# Patient Record
Sex: FEMALE | Race: WHITE | NOT HISPANIC OR LATINO | URBAN - METROPOLITAN AREA
[De-identification: names, ages, dates, MRNs, and addresses within clinical notes are randomized per-mention and may not be internally consistent; named-entity substitution may affect disease eponyms.]

---

## 2022-01-16 ENCOUNTER — EMERGENCY (EMERGENCY)
Facility: HOSPITAL | Age: 32
LOS: 0 days | Discharge: HOME | End: 2022-01-16
Attending: EMERGENCY MEDICINE | Admitting: EMERGENCY MEDICINE
Payer: SELF-PAY

## 2022-01-16 VITALS
HEART RATE: 100 BPM | DIASTOLIC BLOOD PRESSURE: 74 MMHG | OXYGEN SATURATION: 99 % | WEIGHT: 156.09 LBS | RESPIRATION RATE: 18 BRPM | SYSTOLIC BLOOD PRESSURE: 127 MMHG | TEMPERATURE: 98 F

## 2022-01-16 DIAGNOSIS — Z95.0 PRESENCE OF CARDIAC PACEMAKER: ICD-10-CM

## 2022-01-16 DIAGNOSIS — R21 RASH AND OTHER NONSPECIFIC SKIN ERUPTION: ICD-10-CM

## 2022-01-16 DIAGNOSIS — T78.40XA ALLERGY, UNSPECIFIED, INITIAL ENCOUNTER: ICD-10-CM

## 2022-01-16 DIAGNOSIS — L50.9 URTICARIA, UNSPECIFIED: ICD-10-CM
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
## 2022-01-16 DIAGNOSIS — L29.9 PRURITUS, UNSPECIFIED: ICD-10-CM
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
## 2022-01-16 DIAGNOSIS — Z95.0 PRESENCE OF CARDIAC PACEMAKER: Chronic | ICD-10-CM

## 2022-01-16 PROCEDURE — 93010 ELECTROCARDIOGRAM REPORT: CPT

## 2022-01-16 PROCEDURE — 99284 EMERGENCY DEPT VISIT MOD MDM: CPT

## 2022-01-16 RX ORDER — DIPHENHYDRAMINE HCL 50 MG
50 CAPSULE ORAL ONCE
Refills: 0 | Status: COMPLETED | OUTPATIENT
Start: 2022-01-16 | End: 2022-01-16

## 2022-01-16 RX ORDER — FAMOTIDINE 10 MG/ML
20 INJECTION INTRAVENOUS ONCE
Refills: 0 | Status: COMPLETED | OUTPATIENT
Start: 2022-01-16 | End: 2022-01-16

## 2022-01-16 RX ADMIN — Medication 50 MILLIGRAM(S): at 14:11

## 2022-01-16 RX ADMIN — FAMOTIDINE 20 MILLIGRAM(S): 10 INJECTION INTRAVENOUS at 14:09

## 2022-01-16 RX ADMIN — Medication 60 MILLIGRAM(S): at 14:09

## 2022-01-16 NOTE — ED PROVIDER NOTE - ATTENDING CONTRIBUTION TO CARE
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 On review of systems, reports mild increased cough from baseline cough. Denies chest pain or shortness of breath though states since this started she also feels a slight limitation to a full breath. Denies all new exposures, recent antibiotics, OCP use, leg swelling, abdominal pain, vaginal discharge, urinary symptoms, possibility of retained tampon, recent dental procedure, and all other symptoms. On exam, afebrile, hemodynamically stable, saturating well, NAD, well appearing, sitting comfortably in bed, no WOB, speaking full sentences, head NCAT, EOMI grossly, anicteric, MMM, no JVD, RRR, nml S1/S2, no m/r/g, lungs CTAB, no w/r/r, abd soft, NT, ND, nml BS, no rebound or guarding, AAO, CN's 3-12 grossly intact, BRADLEY spontaneously, no leg cyanosis or edema, skin warm, well perfused, noted appearance of hives to back as well as mild to face. No e/o oropharyngeal or systemic, anaphylactic effect. Character low suspicion for PE and no evidence of DVT. No fever or systemic symptoms. Appearance consistent with that of allergic hives. Given Pepcid, Benadryl, and prednisone with improvement. Patient is well appearing, NAD, afebrile, hemodynamically stable. Any available tests and studies were discussed with patient and friend. Discharged with instructions in further symptomatic care, return precautions including worsening SOB/CP, and need for allergy f/u.

## 2022-01-16 NOTE — ED PROVIDER NOTE - CARE PROVIDER_API CALL
Lizzeth Henson)  Allergy and Immunology; Internal Medicine  [address] [place] [address]  Phone: [phone]  Fax: [phone]  Follow Up Time:

## 2022-01-16 NOTE — ED PROVIDER NOTE - OBJECTIVE STATEMENT
31y F pmh pacemaker for bradycardia presents for eval of rash. Pt has mildly pruritic rash to trunk and arms since this morning, no aggravating or relieving factors.

## 2022-01-16 NOTE — ED ADULT NURSE NOTE - CHIEF COMPLAINT QUOTE
Pt developed a rash on the upper body since last night. Pt states she has burning feeling in the chest. Pt has pacemaker.

## 2022-01-16 NOTE — ED PROVIDER NOTE - PATIENT PORTAL LINK FT
You can access the FollowMyHealth Patient Portal offered by [facility] by registering at the following website: [email or web address]. By joining [facility]’s FollowMyHealth portal, you will also be able to view your health information using other applications (apps) compatible with our system.

## 2022-01-16 NOTE — ED PROVIDER NOTE - NSFOLLOWUPINSTRUCTIONS_ED_ALL_ED_FT
Follow up with PMD and Allergist in 1-2 days.    Allergic Reaction    An allergic reaction is an abnormal reaction to a substance (allergen) by the body's defense system. Common allergens include medicines, food, insect bites or stings, and blood products. The body releases certain proteins into the blood that can cause a variety of symptoms such as an itchy rash, wheezing, swelling of the face/lips/tongue/throat, abdominal pain, nausea or vomiting. An allergic reaction is usually treated with medication. If your health care provider prescribed you an epinephrine injection device, make sure to keep it with you at all times.    SEEK IMMEDIATE MEDICAL CARE IF YOU HAVE THE FOLLOWING SYMPTOMS: allergic reaction severe enough that required you to use epinephrine, tightness in your chest, swelling around your lips/tongue/throat, abdominal pain, vomiting or diarrhea, or lightheadedness/dizziness. These symptoms may represent a serious problem that is an emergency. Do not wait to see if the symptoms will go away. Use your auto-injector pen or anaphylaxis kit as you have been instructed, and get medical help right away. Call your local emergency services (911 in the [place] Do not drive yourself to the hospital.

## 2022-01-16 NOTE — ED PROVIDER NOTE - NS ED ROS FT
Constitutional: (-) fever  Eyes/ENT: (-) blurry vision, (-) epistaxis  Cardiovascular: (-) chest pain, (-) syncope  Respiratory: (-) cough, (-) shortness of breath  Gastrointestinal: (-) vomiting, (-) diarrhea  : (-) dysuria, (-) hematuria  Musculoskeletal: (-) neck pain, (-) back pain, (-) joint pain  Integumentary: (+) rash, (-) edema  Neurological: (-) headache, (-) altered mental status  Allergic/Immunologic: (+) pruritus

## 2022-01-16 NOTE — ED ADULT TRIAGE NOTE - CHIEF COMPLAINT QUOTE
Pt developed a rash on the upper body since last night. Pt states she has burning in the chest. Pt has pacemaker. Pt developed a rash on the upper body since last night. Pt states she has burning feeling in the chest. Pt has pacemaker.

## 2022-01-16 NOTE — ED PROVIDER NOTE - PHYSICAL EXAMINATION
CONST: Well appearing in NAD  EYES: Sclera and conjunctiva clear.   ENT: No nasal discharge. Oropharynx normal appearing, no erythema or exudates. No abscess or swelling. Uvula midline.   NECK: Non-tender, no meningeal signs. normal ROM. supple   CARD: S1 S2; No jvd  RESP: Equal BS B/L, No wheezes, rhonchi or rales. No distress  GI: Soft, non-tender, non-distended. normal BS  MS: Normal ROM in all extremities. pulses 2 +. no calf tenderness or swelling  SKIN: Maculopapular rash to trunk and arms  NEURO: A&Ox3, No focal deficits. Strength 5/5 with no sensory deficits.

## 2024-11-06 ENCOUNTER — EMERGENCY (EMERGENCY)
Facility: HOSPITAL | Age: 34
LOS: 0 days | Discharge: HOME | End: 2024-11-06
Attending: EMERGENCY MEDICINE
Payer: SELF-PAY

## 2024-11-06 VITALS
HEIGHT: 61 IN | DIASTOLIC BLOOD PRESSURE: 82 MMHG | WEIGHT: 149.91 LBS | RESPIRATION RATE: 18 BRPM | HEART RATE: 84 BPM | SYSTOLIC BLOOD PRESSURE: 116 MMHG | TEMPERATURE: 98 F | OXYGEN SATURATION: 99 %

## 2024-11-06 DIAGNOSIS — O23.41 UNSPECIFIED INFECTION OF URINARY TRACT IN PREGNANCY, FIRST TRIMESTER: ICD-10-CM

## 2024-11-06 DIAGNOSIS — O99.891 OTHER SPECIFIED DISEASES AND CONDITIONS COMPLICATING PREGNANCY: ICD-10-CM

## 2024-11-06 DIAGNOSIS — O99.011 ANEMIA COMPLICATING PREGNANCY, FIRST TRIMESTER: ICD-10-CM

## 2024-11-06 DIAGNOSIS — Z3A.13 13 WEEKS GESTATION OF PREGNANCY: ICD-10-CM

## 2024-11-06 DIAGNOSIS — Z95.0 PRESENCE OF CARDIAC PACEMAKER: ICD-10-CM

## 2024-11-06 DIAGNOSIS — R07.9 CHEST PAIN, UNSPECIFIED: ICD-10-CM

## 2024-11-06 DIAGNOSIS — Z95.0 PRESENCE OF CARDIAC PACEMAKER: Chronic | ICD-10-CM

## 2024-11-06 LAB
ALBUMIN SERPL ELPH-MCNC: 3.9 G/DL — SIGNIFICANT CHANGE UP (ref 3.5–5.2)
ALP SERPL-CCNC: 46 U/L — SIGNIFICANT CHANGE UP (ref 30–115)
ALT FLD-CCNC: 12 U/L — SIGNIFICANT CHANGE UP (ref 0–41)
ANION GAP SERPL CALC-SCNC: 9 MMOL/L — SIGNIFICANT CHANGE UP (ref 7–14)
APPEARANCE UR: ABNORMAL
APTT BLD: 32.7 SEC — SIGNIFICANT CHANGE UP (ref 27–39.2)
AST SERPL-CCNC: 16 U/L — SIGNIFICANT CHANGE UP (ref 0–41)
BACTERIA # UR AUTO: ABNORMAL /HPF
BASOPHILS # BLD AUTO: 0.03 K/UL — SIGNIFICANT CHANGE UP (ref 0–0.2)
BASOPHILS NFR BLD AUTO: 0.3 % — SIGNIFICANT CHANGE UP (ref 0–1)
BILIRUB SERPL-MCNC: 0.4 MG/DL — SIGNIFICANT CHANGE UP (ref 0.2–1.2)
BILIRUB UR-MCNC: NEGATIVE — SIGNIFICANT CHANGE UP
BLD GP AB SCN SERPL QL: SIGNIFICANT CHANGE UP
BUN SERPL-MCNC: 9 MG/DL — LOW (ref 10–20)
CALCIUM SERPL-MCNC: 9.2 MG/DL — SIGNIFICANT CHANGE UP (ref 8.4–10.5)
CAST: 0 /LPF — SIGNIFICANT CHANGE UP (ref 0–4)
CHLORIDE SERPL-SCNC: 103 MMOL/L — SIGNIFICANT CHANGE UP (ref 98–110)
CO2 SERPL-SCNC: 23 MMOL/L — SIGNIFICANT CHANGE UP (ref 17–32)
COLOR SPEC: YELLOW — SIGNIFICANT CHANGE UP
CREAT SERPL-MCNC: 0.5 MG/DL — LOW (ref 0.7–1.5)
D DIMER BLD IA.RAPID-MCNC: 268 NG/ML DDU — HIGH
DIFF PNL FLD: NEGATIVE — SIGNIFICANT CHANGE UP
EGFR: 125 ML/MIN/1.73M2 — SIGNIFICANT CHANGE UP
EOSINOPHIL # BLD AUTO: 0.08 K/UL — SIGNIFICANT CHANGE UP (ref 0–0.7)
EOSINOPHIL NFR BLD AUTO: 0.8 % — SIGNIFICANT CHANGE UP (ref 0–8)
GLUCOSE SERPL-MCNC: 92 MG/DL — SIGNIFICANT CHANGE UP (ref 70–99)
GLUCOSE UR QL: NEGATIVE MG/DL — SIGNIFICANT CHANGE UP
HCG SERPL-ACNC: HIGH MIU/ML
HCT VFR BLD CALC: 33.1 % — LOW (ref 37–47)
HGB BLD-MCNC: 11.7 G/DL — LOW (ref 12–16)
IMM GRANULOCYTES NFR BLD AUTO: 0.4 % — HIGH (ref 0.1–0.3)
INR BLD: 1.01 RATIO — SIGNIFICANT CHANGE UP (ref 0.65–1.3)
KETONES UR-MCNC: NEGATIVE MG/DL — SIGNIFICANT CHANGE UP
LEUKOCYTE ESTERASE UR-ACNC: ABNORMAL
LYMPHOCYTES # BLD AUTO: 1.83 K/UL — SIGNIFICANT CHANGE UP (ref 1.2–3.4)
LYMPHOCYTES # BLD AUTO: 17.6 % — LOW (ref 20.5–51.1)
MAGNESIUM SERPL-MCNC: 1.8 MG/DL — SIGNIFICANT CHANGE UP (ref 1.8–2.4)
MCHC RBC-ENTMCNC: 33 PG — HIGH (ref 27–31)
MCHC RBC-ENTMCNC: 35.3 G/DL — SIGNIFICANT CHANGE UP (ref 32–37)
MCV RBC AUTO: 93.2 FL — SIGNIFICANT CHANGE UP (ref 81–99)
MONOCYTES # BLD AUTO: 0.65 K/UL — HIGH (ref 0.1–0.6)
MONOCYTES NFR BLD AUTO: 6.3 % — SIGNIFICANT CHANGE UP (ref 1.7–9.3)
NEUTROPHILS # BLD AUTO: 7.74 K/UL — HIGH (ref 1.4–6.5)
NEUTROPHILS NFR BLD AUTO: 74.6 % — SIGNIFICANT CHANGE UP (ref 42.2–75.2)
NITRITE UR-MCNC: NEGATIVE — SIGNIFICANT CHANGE UP
NRBC # BLD: 0 /100 WBCS — SIGNIFICANT CHANGE UP (ref 0–0)
PH UR: 6 — SIGNIFICANT CHANGE UP (ref 5–8)
PLATELET # BLD AUTO: 240 K/UL — SIGNIFICANT CHANGE UP (ref 130–400)
PMV BLD: 9.5 FL — SIGNIFICANT CHANGE UP (ref 7.4–10.4)
POTASSIUM SERPL-MCNC: 4.1 MMOL/L — SIGNIFICANT CHANGE UP (ref 3.5–5)
POTASSIUM SERPL-SCNC: 4.1 MMOL/L — SIGNIFICANT CHANGE UP (ref 3.5–5)
PROT SERPL-MCNC: 6.3 G/DL — SIGNIFICANT CHANGE UP (ref 6–8)
PROT UR-MCNC: SIGNIFICANT CHANGE UP MG/DL
PROTHROM AB SERPL-ACNC: 11.9 SEC — SIGNIFICANT CHANGE UP (ref 9.95–12.87)
RBC # BLD: 3.55 M/UL — LOW (ref 4.2–5.4)
RBC # FLD: 11.8 % — SIGNIFICANT CHANGE UP (ref 11.5–14.5)
RBC CASTS # UR COMP ASSIST: 0 /HPF — SIGNIFICANT CHANGE UP (ref 0–4)
SODIUM SERPL-SCNC: 135 MMOL/L — SIGNIFICANT CHANGE UP (ref 135–146)
SP GR SPEC: 1.03 — SIGNIFICANT CHANGE UP (ref 1–1.03)
SQUAMOUS # UR AUTO: 4 /HPF — SIGNIFICANT CHANGE UP (ref 0–5)
TROPONIN T, HIGH SENSITIVITY RESULT: <6 NG/L — SIGNIFICANT CHANGE UP (ref 6–13)
UROBILINOGEN FLD QL: 1 MG/DL — SIGNIFICANT CHANGE UP (ref 0.2–1)
WBC # BLD: 10.37 K/UL — SIGNIFICANT CHANGE UP (ref 4.8–10.8)
WBC # FLD AUTO: 10.37 K/UL — SIGNIFICANT CHANGE UP (ref 4.8–10.8)
WBC UR QL: 4 /HPF — SIGNIFICANT CHANGE UP (ref 0–5)

## 2024-11-06 PROCEDURE — 71045 X-RAY EXAM CHEST 1 VIEW: CPT

## 2024-11-06 PROCEDURE — 83735 ASSAY OF MAGNESIUM: CPT

## 2024-11-06 PROCEDURE — 85730 THROMBOPLASTIN TIME PARTIAL: CPT

## 2024-11-06 PROCEDURE — 93010 ELECTROCARDIOGRAM REPORT: CPT

## 2024-11-06 PROCEDURE — 86900 BLOOD TYPING SEROLOGIC ABO: CPT

## 2024-11-06 PROCEDURE — 76815 OB US LIMITED FETUS(S): CPT | Mod: 26

## 2024-11-06 PROCEDURE — 86901 BLOOD TYPING SEROLOGIC RH(D): CPT

## 2024-11-06 PROCEDURE — 85610 PROTHROMBIN TIME: CPT

## 2024-11-06 PROCEDURE — 84484 ASSAY OF TROPONIN QUANT: CPT

## 2024-11-06 PROCEDURE — 71045 X-RAY EXAM CHEST 1 VIEW: CPT | Mod: 26

## 2024-11-06 PROCEDURE — 81001 URINALYSIS AUTO W/SCOPE: CPT

## 2024-11-06 PROCEDURE — 93005 ELECTROCARDIOGRAM TRACING: CPT

## 2024-11-06 PROCEDURE — 80053 COMPREHEN METABOLIC PANEL: CPT

## 2024-11-06 PROCEDURE — 87086 URINE CULTURE/COLONY COUNT: CPT

## 2024-11-06 PROCEDURE — 86850 RBC ANTIBODY SCREEN: CPT

## 2024-11-06 PROCEDURE — 36415 COLL VENOUS BLD VENIPUNCTURE: CPT

## 2024-11-06 PROCEDURE — 93970 EXTREMITY STUDY: CPT

## 2024-11-06 PROCEDURE — 93970 EXTREMITY STUDY: CPT | Mod: 26

## 2024-11-06 PROCEDURE — 99285 EMERGENCY DEPT VISIT HI MDM: CPT | Mod: 25

## 2024-11-06 PROCEDURE — 99285 EMERGENCY DEPT VISIT HI MDM: CPT

## 2024-11-06 PROCEDURE — 36000 PLACE NEEDLE IN VEIN: CPT

## 2024-11-06 PROCEDURE — 76815 OB US LIMITED FETUS(S): CPT

## 2024-11-06 PROCEDURE — 84702 CHORIONIC GONADOTROPIN TEST: CPT

## 2024-11-06 PROCEDURE — 85025 COMPLETE CBC W/AUTO DIFF WBC: CPT

## 2024-11-06 PROCEDURE — 85379 FIBRIN DEGRADATION QUANT: CPT

## 2024-11-06 RX ORDER — CEPHALEXIN 125 MG/5ML
1 SUSPENSION, RECONSTITUTED, ORAL (ML) ORAL
Qty: 21 | Refills: 0
Start: 2024-11-06 | End: 2024-11-12

## 2024-11-06 NOTE — ED PROVIDER NOTE - CARE PROVIDER_API CALL
none
JOEL PICKETT  [address] [facility],  [address]  Phone: [phone]  Fax: [phone]  Follow Up Time: 1-3 Days

## 2024-11-06 NOTE — ED PROVIDER NOTE - ATTENDING APP SHARED VISIT CONTRIBUTION OF CARE
35 y/o female with h/o bradycardia s/p PPM at age 11, recent + home pregnancy test, in ER with c/o CP.  Pt states she has h/o irreg periods, LMP was 8/2024, she just took a home preg test which was +.  Earlier today, she had an episode of CP.  states she had sharp L sided CP, non-radiating, lasted ~ 20 minutes and then resolved.  no SOB, but states it hurt when she took a deep breath.  no n/v, no diaphoresis. no palpitations, no dizziness, no syncope/near syncope.   no LE pain/swelling. has been feeling fatigued.  no abd/pelvic pain. no VB or abnormal vaginal discharge. no dysuria, hematuria, mild urinary frequency for the past 2 months.  no HA.  symptoms lasted for 20 minutes and then resolved, no return of symptoms.  Pt follows yearly with card in [place].  last echo was ~ 1 yr ago.  PPM was changed 3 yrs ago.    PE - nad, nc/at, eomi, perrl, op - clear, mmm, neck supple, cta b/l, no w/r/r, rrr, abd- soft, nt/nd, nabs, from x 4, no LE swelling/tenderness, A&O x 3, cn 2-12 intact, no focal motor/sensory deficits.   -check labs, cardiac w/u, pelvic sono

## 2024-11-06 NOTE — ED ADULT NURSE NOTE - BREATH SOUNDS, MLM
DM Type 2 (Diabetes Mellitus, Type 2) (ICD9 250.00)    HTN (Hypertension) (ICD9 401.9)    Hyperlipidemia    Osteoarthritis of Knee (ICD9 715.96)  b/l knees  Uterine Cancer (ICD9 179)  treated with hysterectomy and radiation therapy in 2008
Clear

## 2024-11-06 NOTE — ED PROVIDER NOTE - OBJECTIVE STATEMENT
34-year-old female with past medical history of pacemaker for bradycardia presented for evaluation of an episode of left-sided chest pain rating to left arm that started around 11 AM this morning.  States that it radiates to her back and lasted about 20 minutes.  Patient also notes that she is pregnant and has not had a workup yet.  No abdominal pain, vaginal bleeding, vaginal discharge, or urinary symptoms.  No leg swelling or calf pain.

## 2024-11-06 NOTE — ED ADULT TRIAGE NOTE - CHIEF COMPLAINT QUOTE
L arm pain & chest pain radiating from L shoulder pain since 11am. Pt + pregnancy test a couple of days ago. Unsure when LMP was.

## 2024-11-06 NOTE — ED PROVIDER NOTE - CONSIDERATION OF ADMISSION OBSERVATION
Consideration of Admission/Observation admission/obs considered, but pt's symptoms fully resolved, low suspicion for significant pathology, and ER w/u unremarkable.  pt comfortable going home with card/obgyn outpt f/u and aware of return precautions.

## 2024-11-06 NOTE — ED PROVIDER NOTE - NSFOLLOWUPINSTRUCTIONS_ED_ALL_ED_FT
**follow up with your OBGYN within 1-3 days and follow up with your cardiologist**    Chest Pain    Chest pain can be caused by many different conditions which may or may not be dangerous. Causes include heartburn, lung infections, heart attack, blood clot in lungs, skin infections, strain or damage to muscle, cartilage, or bones, etc. In addition to a history and physical examination, an electrocardiogram (ECG) or other lab tests may have been performed to determine the cause of your chest pain. Follow up with your primary care provider or with a cardiologist as instructed.     SEEK IMMEDIATE MEDICAL CARE IF YOU HAVE ANY OF THE FOLLOWING SYMPTOMS: worsening chest pain, coughing up blood, unexplained back/neck/jaw pain, severe abdominal pain, dizziness or lightheadedness, fainting, shortness of breath, sweaty or clammy skin, vomiting, or racing heart beat. These symptoms may represent a serious problem that is an emergency. Do not wait to see if the symptoms will go away. Get medical help right away. Call 911 and do not drive yourself to the hospital.    Urinary Tract Infection    A urinary tract infection (UTI) is an infection of any part of the urinary tract, which includes the kidneys, ureters, bladder, and urethra. Risk factors include ignoring your need to urinate, wiping back to front if female, being an uncircumcised male, and having diabetes or a weak immune system. Symptoms include frequent urination, pain or burning with urination, foul smelling urine, cloudy urine, pain in the lower abdomen, blood in the urine, and fever. If you were prescribed an antibiotic medicine, take it as told by your health care provider. Do not stop taking the antibiotic even if you start to feel better.    SEEK IMMEDIATE MEDICAL CARE IF YOU HAVE ANY OF THE FOLLOWING SYMPTOMS: severe back or abdominal pain, fever, inability to keep fluids or medicine down, dizziness/lightheadedness, or a change in mental status.

## 2024-11-06 NOTE — ED PROVIDER NOTE - PATIENT PORTAL LINK FT
You can access the FollowMyHealth Patient Portal offered by [facility] by registering at the following website: [email or web address]. By joining [facility]’s FollowMyHealth portal, you will also be able to view your health information using other applications (apps) compatible with our system.

## 2024-11-06 NOTE — ED ADULT NURSE NOTE - SUICIDE SCREENING QUESTION 3
No
[FreeTextEntry1] : 27 year old P3 F presenting for repeat sonogram to ensure completion of ETOP. On ultrasound today, the endometrial lining is thickened with highly vascularized decidual tissue, suggestive of AV malformation. Low suspicion for placental polyps in this case.\par -uterus is normal size, cervix is long and closed\par -no pain or tenderness on exam\par -will continue to follow serial beta hCG\par -f/u CBC drawn today as well\par -f/u in 2 weeks for repeat beta hCG and repeat GYN ultrasound to monitor findings\par -advised patient to return sooner for heavy bleeding or any other complaints\par \par =============================================\par I, Celia Kilpatrick, acted solely as a scribe for Dr. Chente Rocha on Jan 18 2022  7:20PM. All medical entries made by the Scribe were at my, Dr. Chente Rocha's, direction and personally dictated by me on Jan 18 2022  7:20PM . I have reviewed the chart and agree that the record accurately reflects my personal performance of the history, physical exam, assessment, and plan. I have also personally directed, reviewed, and agreed with the chart.\par =============================================

## 2024-11-06 NOTE — ED PROVIDER NOTE - NSFOLLOWUPCLINICS_GEN_ALL_ED_FT
[facility]  OB/GYN  [address] [place] [address]  Phone: [phone]  Fax:   Follow Up Time: 1-3 Days

## 2024-11-06 NOTE — ED PROVIDER NOTE - PROGRESS NOTE DETAILS
Using YEARS criteria PE can be excluded. Labs reviewed: CBC with mild anemia (Hgb 11.7), CMP unremarkable.  HST <6, D-dimer: 268.  hCG 28,050.  UA: Trace leuks, few bacteria.  Pelvic sono: + IUP corresponding to gestational age of 13 weeks 4 days, + FH@159.  Prelim LE duplex negative for DVT.  CXR negative.  EKG: Paced rhythm@79.  Pt's symptoms lasted 20 minutes and then resolved.  low suspicion for ACS and HST neg. low suspicion for PE and YEARS criteria negative (d-dimer < 500 and LE duplex neg).  PM interrogated in ER, which showed no recent events.  Pt remains aymptomatic in ER, did not have any palpitations/dizziness/syncope/near syncope.  Spoke with on call doctor covering for pt's cardiologist (pt follows with Dr. Velasco in [place]), states symptoms unlikley to be related to her PPM, he will pass a message to her cardiologist and pt can f/u as outpt.   All above d/w pt and pt given copy of all available results.  UA with trace leuks and few bacteria.  as she is pregnant will give rx for keflex and pt will follow-up results of ucx  pt will f/u with her cardiologist tomorrow and to also f/u [facility] obgyn.  pt comfortable going home, aware she should return to ER if CP recurs, for any SOB, LE pain/swelling, palpitations, dizziness, fever, abdominal/pelvic pain, vaginal bleeding, or any other new/concerning symptom.  Patient understands and agrees with plan.

## 2024-11-08 LAB
CULTURE RESULTS: NO GROWTH — SIGNIFICANT CHANGE UP
SPECIMEN SOURCE: SIGNIFICANT CHANGE UP

## 2025-01-02 NOTE — HISTORY OF PRESENT ILLNESS
[FreeTextEntry1] : [facility] Progress Note  Date of Service:  25  Patient is a 33yo , @22w3d, LARRY 25 by LMP c/w first trimester sono, presenting for consultation for pacemaker. Currently managed by Dr. Castillo.  Ms. Martinez is considering termination of pregnancy.  There was cervical dilation with prolapsing membranes into vagina on OB ultrasound today.  Patient denies contractions, leakage of fluid or bleeding per vagina.   Physical Exam:  Deferre  OBUS:  A/P: 33yo , @22w3d, LARRY 25, with congenital heart block with pacemaker, and painless cervical dilation who is considering termination of pregnancy.  Patient to go to labor and delivery for further evaluation.  I spoke with Dr. Haile of Family Planning service and with the OB resident service at [facility].  Further management per the OB team.  If patient desires to continue pregnancy, then recommend admission to [facility] as high risk for PTL and PPROM with early delivery at cusp of viability.   2.  Congenital Heart Block with Pacemaker - Follows with Cardiologist: Dr. Ragsdale in [place], [place] - Next appointment 1/10  Ronnie aPz MD Maternal Fetal Medicine  Total time 50 minutes excluding teaching and separately reported services.

## 2025-01-05 NOTE — HISTORY OF PRESENT ILLNESS
[FreeTextEntry1] : 25 [facility] Att'g and PGY3 initial office Consult Note:    Ms Martinez is referred by Dr Castillo and returns after recent hosp for VB and adv cx dilation, d/c'd AMA on . [id number], @22w1d (larry 25 by LMP c/w [facility] CRL), initially presented to [facility] for consultation for pacemaker.  Ms. Martinez first established care with [facility] at 20w.   At her 21w4d OB visit she reported an episode of heavy post-coital vag bleeding, BRB, soaking bedsheets, 48h prior (0200,).  SSE showed membranes passed ext os, and US showed 2.5cm cervical dilation. She had already been evaluated at [facility] and left there AMA.  She has not had any further bleeding episodes.  She was told  at [facility] that she may have a shortened cervix and that there may be amniontic membrane passing into the cervix.   Admission to [facility] - for painless cervical dilation. Pt had TVUS performed by sonographer. On ultrasound cervix external os appeared 1-2cm dilated. Speculum performed, cervix visually 2cm dilated with membranes at the level of the external os. She received latency antibiotics. Patient AMA'd from hospital on .  Today she has no c/o.  She denies LAP/pressure, no cramping, no LOF, VB.  She perceives FM.  States she left the hospital last night mainly due to roommate with dementia interrupting her sleep and the fact that her partner could not stay overnight for support. She denies contractions, leakage of fluid, vaginal bleeding. Endorses good fetal movement.   PMHx:  Congenital heart block with pacemaker since 12yo (see below).                All: denies Meds: denies  OB Hx:     PNC delayed due to trouble finding an OB.      24 [facility] ED: +UHCG, and US showing IUP at 13w4d. iTOPx2, D&Cx1 Gyn Hx: possible HPV + in ; denies abnormal pap smears, fibroids, cysts, stds  PMH: PSH: D&Cx1  FH: denies FHx diabetes, htn, asthma, cardiac issues  SH: Reported EtOH and cocaine use early, works [employer], lives with boyfriend; Psych: denies  Genetics: denies h/o genetic abnormalities   Physical Exam: BP:109/76 , HR: 82bpm, O2sat: 100%, Wt: 165lbs, GA: AOx3, NAD  Heart: RRR, no M/R/G  Lungs: CTAB  Abd: soft, nontender, nondistended, gravid  LE: no erythema, edema or pain   Labs:  Apos/AntibNeg.  HbEP not found.   Gonorrhea neg, Chlamydia neg, Trichomonas neg : HCV neg, HBsAg NR, RPR neg, A pos, GBS neg, HIV neg, UDS neg, MMR immune  10.07><189, Coags 12.2/..8, fibrinogen 493, KB 0  Maternal Imaging/Testin/29:  EKG ([facility]) Line Atrial-sensed ventricular-paced rhythm at 80 bpm.  Abnormal ECG  OBUS:  24: [facility] ED:  SIUP CRL  24: MFM Inpt: SFBreech at 22w0d, plac ant no previa. DVP 6.7cm.  FHR  Int os 1cm dilated.  Amniotic sac within the canal up to external os which is 1.5cm dilated.  :  MFM Outpt: SFBreech at 22w1d, plac ant no previa. DVP 5.8cm, EFW 606g, 97th%ile. AC 90th%ile.  Jesika limited views, normal.  Cx 1.4cm dilated, membranes prolapsed into vagina, 3x3cm. No fetal parts are prolapsed.  Pt declines readmission to hospital.   A/P: 35yo , @22w1d, LARRY 25, with congenital heart block with pacemaker, and painless cervical dilation  1. Painless Cervical Dilation :TAUS today demonstrates widening of cervix > 2cm with fetus in complete breech presentation and membranes funneling 4.5cm past cervix.     We confirm extremely guarded pregnancy prognosis, and reviewed these findings along with previous discussions with patient. The pregnancy is likely being maintained due to fetal back down presentation.  Explained that the cervix is very unlikely to close.   -->Cerclage is not indicated at this time due Hx of heavy vaginal bleeding on .  We reviewed high risk of membrane rupture and infection when Cx is this far dilated.        Ms Martinez reiterated her decision to continue expectant management at this time.  She agrees to present to L&D triage tomorrow for repeat sonogram.  Bleeding and PTL precautions discussed.    2.  Congenital heart block with pacemaker since 11 yrs old.  Follows with Cardiologist:  Dr. Ragsdale, [place], [place]. Next halle't 1/10/25. During 25 admission OB team was able to contact patient's OP cardiologist: Dr. Ragsdale  in [place], [place]. Per verbal report: "Pacemaker placed in  for congenital heart block. The last generator change was in . She follows up irregularly and completes remote checks of her device, however limited records available."   Pacemaker was interrogated by [facility] cardiology EP team, see report below: "Pts device MDT single chamber PPM was interrogated on 24: Device working properly Battery longevity: 4 years Mode: VDD 50/150 bpm Presenting rhythm: AsVp 90 bpm Patient has h/o intermittent 2nd and 3rd degree AV block Events: Several brief AHR episodes were recorded since 24 to 24 c/w PAT vs PAFIB.  Patient should follow up with her EP doctor as scheduled. Reviewed by attending Dr. Wooten"  3. Pregnancy. Panorama and Horizon screening recommended and pt states strong desire for this testing, however she has not yet presented to lab for blood draw.    Plan:  - Expectant mgmt per pt's decision.  - Patient to come to L&D 25 for TAUS  - To L&D for VB, LOF, fever, shills, LAP.   MD Bella, FACOG with JESS Green MD, PGY-3

## 2025-01-05 NOTE — HISTORY OF PRESENT ILLNESS
[FreeTextEntry1] : 25 [facility] Att'g and PGY3 initial office Consult Note:    Ms Martinez is referred by Dr Castillo and returns after recent hosp for VB and adv cx dilation, d/c'd AMA on . [id number], @22w1d (larry 25 by LMP c/w [facility] CRL), initially presented to [facility] for consultation for pacemaker.  Ms. Martinez first established care with [facility] at 20w.   At her 21w4d OB visit she reported an episode of heavy post-coital vag bleeding, BRB, soaking bedsheets, 48h prior (0200,).  SSE showed membranes passed ext os, and US showed 2.5cm cervical dilation. She had already been evaluated at [facility] and left there AMA.  She has not had any further bleeding episodes.  She was told  at [facility] that she may have a shortened cervix and that there may be amniontic membrane passing into the cervix.   Admission to [facility] - for painless cervical dilation. Pt had TVUS performed by sonographer. On ultrasound cervix external os appeared 1-2cm dilated. Speculum performed, cervix visually 2cm dilated with membranes at the level of the external os. She received latency antibiotics. Patient AMA'd from hospital on .  Today she has no c/o.  She denies LAP/pressure, no cramping, no LOF, VB.  She perceives FM.  States she left the hospital last night mainly due to roommate with dementia interrupting her sleep and the fact that her partner could not stay overnight for support. She denies contractions, leakage of fluid, vaginal bleeding. Endorses good fetal movement.   PMHx:  Congenital heart block with pacemaker since 12yo (see below).                All: denies Meds: denies  OB Hx:     PNC delayed due to trouble finding an OB.      24 [facility] ED: +[facility]CG, and US showing IUP at 13w4d. iTOPx2, D&Cx1 Gyn Hx: possible HPV + in ; denies abnormal pap smears, fibroids, cysts, stds  PMH: PSH: D&Cx1  FH: denies FHx diabetes, htn, asthma, cardiac issues  SH: Reported EtOH and cocaine use early, works [employer], lives with boyfriend; Psych: denies  Genetics: denies h/o genetic abnormalities   Physical Exam: BP:109/76 , HR: 82bpm, O2sat: 100%, Wt: 165lbs, GA: AOx3, NAD  Heart: RRR, no M/R/G  Lungs: CTAB  Abd: soft, nontender, nondistended, gravid  LE: no erythema, edema or pain   Labs:  Apos/AntibNeg.  HbEP not found.   Gonorrhea neg, Chlamydia neg, Trichomonas neg : HCV neg, HBsAg NR, RPR neg, A pos, GBS neg, HIV neg, UDS neg, MMR immune  10.07><189, Coags 12.2/..8, fibrinogen 493, KB 0  Maternal Imaging/Testin/29:  EKG ([facility]) Line Atrial-sensed ventricular-paced rhythm at 80 bpm.  Abnormal ECG  OBUS:  24: [facility] ED:  [facility] CRL  24: MFM Inpt: SFBreech at 22w0d, plac ant no previa. DVP 6.7cm.  FHR  Int os 1cm dilated.  Amniotic sac within the canal up to external os which is 1.5cm dilated.  :  MFM Outpt: SFBreech at 22w1d, plac ant no previa. DVP 5.8cm, EFW 606g, 97th%ile. AC 90th%ile.  Jesika limited views, normal.  Cx 1.4cm dilated, membranes prolapsed into vagina, 3x3cm. No fetal parts are prolapsed.  Pt declines readmission to hospital.   A/P: 35yo , @22w1d, LARRY 25, with congenital heart block with pacemaker, and painless cervical dilation  1. Painless Cervical Dilation :TAUS today demonstrates widening of cervix > 2cm with fetus in complete breech presentation and membranes funneling 4.5cm past cervix.     We confirm extremely guarded pregnancy prognosis, and reviewed these findings along with previous discussions with patient. The pregnancy is likely being maintained due to fetal back down presentation.  Explained that the cervix is very unlikely to close.   -->Cerclage is not indicated at this time due Hx of heavy vaginal bleeding on .  We reviewed high risk of membrane rupture and infection when Cx is this far dilated.        Ms Martinez reiterated her decision to continue expectant management at this time.  She agrees to present to L&D triage tomorrow for repeat sonogram.  Bleeding and PTL precautions discussed.    2.  Congenital heart block with pacemaker since 11 yrs old.  Follows with Cardiologist:  Dr. Ragsdale, [place], [place]. Next halle't 1/10/25. During 25 admission OB team was able to contact patient's OP cardiologist: Dr. Ragsdale  in [place], [place]. Per verbal report: "Pacemaker placed in  for congenital heart block. The last generator change was in . She follows up irregularly and completes remote checks of her device, however limited records available."   Pacemaker was interrogated by [facility] cardiology EP team, see report below: "Pts device MDT single chamber PPM was interrogated on 24: Device working properly Battery longevity: 4 years Mode: VDD 50/150 bpm Presenting rhythm: AsVp 90 bpm Patient has h/o intermittent 2nd and 3rd degree AV block Events: Several brief AHR episodes were recorded since 24 to 24 c/w PAT vs PAFIB.  Patient should follow up with her EP doctor as scheduled. Reviewed by attending Dr. Wooten"  3. Pregnancy. Panorama and Horizon screening recommended and pt states strong desire for this testing, however she has not yet presented to lab for blood draw.    Plan:  - Expectant mgmt per pt's decision.  - Patient to come to L&D 25 for TAUS  - To L&D for VB, LOF, fever, shills, LAP.   MD Bella, FACOG with JESS Green MD, PGY-3

## 2025-01-05 NOTE — HISTORY OF PRESENT ILLNESS
[FreeTextEntry1] : 25 [facility] Att'g and PGY3 initial office Consult Note:    Ms Martinez is referred by Dr Castillo and returns after recent hosp for VB and adv cx dilation, d/c'd AMA on . [id number], @22w1d (larry 25 by LMP c/w [facility]), initially presented to [facility] for consultation for pacemaker.  Ms. Martinez first established care with [facility] at 20w.   At her 21w4d OB visit she reported an episode of heavy post-coital vag bleeding, BRB, soaking bedsheets, 48h prior (0200,).  SSE showed membranes passed ext os, and US showed 2.5cm cervical dilation. She had already been evaluated at [facility] and left there AMA.  She has not had any further bleeding episodes.  She was told  at [facility] that she may have a shortened cervix and that there may be amniontic membrane passing into the cervix.   Admission to [facility] - for painless cervical dilation. Pt had TVUS performed by sonographer. On ultrasound cervix external os appeared 1-2cm dilated. Speculum performed, cervix visually 2cm dilated with membranes at the level of the external os. She received latency antibiotics. Patient AMA'd from hospital on .  Today she has no c/o.  She denies LAP/pressure, no cramping, no LOF, VB.  She perceives FM.  States she left the hospital last night mainly due to roommate with dementia interrupting her sleep and the fact that her partner could not stay overnight for support. She denies contractions, leakage of fluid, vaginal bleeding. Endorses good fetal movement.   PMHx:  Congenital heart block with pacemaker since 12yo (see below).                All: denies Meds: denies  OB Hx:     PNC delayed due to trouble finding an OB.      24 [facility] ED: +UHCG, and US showing IUP at 13w4d. iTOPx2, D&Cx1 Gyn Hx: possible HPV + in ; denies abnormal pap smears, fibroids, cysts, stds  PMH: PSH: D&Cx1  FH: denies FHx diabetes, htn, asthma, cardiac issues  SH: Reported EtOH and cocaine use early, works [employer], lives with boyfriend; Psych: denies  Genetics: denies h/o genetic abnormalities   Physical Exam: BP:109/76 , HR: 82bpm, O2sat: 100%, Wt: 165lbs, GA: AOx3, NAD  Heart: RRR, no M/R/G  Lungs: CTAB  Abd: soft, nontender, nondistended, gravid  LE: no erythema, edema or pain   Labs:  Apos/AntibNeg.  HbEP not found.   Gonorrhea neg, Chlamydia neg, Trichomonas neg : HCV neg, HBsAg NR, RPR neg, A pos, GBS neg, HIV neg, UDS neg, MMR immune  10.07><189, Coags 12.2/..8, fibrinogen 493, KB 0  Maternal Imaging/Testin/29:  EKG ([facility]) Line Atrial-sensed ventricular-paced rhythm at 80 bpm.  Abnormal ECG  OBUS:  24: [facility] ED:  [facility] CRL  24: MFM Inpt: SFBreech at 22w0d, plac ant no previa. DVP 6.7cm.  FHR  Int os 1cm dilated.  Amniotic sac within the canal up to external os which is 1.5cm dilated.  :  MFM Outpt: SFBreech at 22w1d, plac ant no previa. DVP 5.8cm, EFW 606g, 97th%ile. AC 90th%ile.  Jesika limited views, normal.  Cx 1.4cm dilated, membranes prolapsed into vagina, 3x3cm. No fetal parts are prolapsed.  Pt declines readmission to hospital.   A/P: 33yo , @22w1d, LARRY 25, with congenital heart block with pacemaker, and painless cervical dilation  1. Painless Cervical Dilation :TAUS today demonstrates widening of cervix > 2cm with fetus in complete breech presentation and membranes funneling 4.5cm past cervix.     We confirm extremely guarded pregnancy prognosis, and reviewed these findings along with previous discussions with patient. The pregnancy is likely being maintained due to fetal back down presentation.  Explained that the cervix is very unlikely to close.   -->Cerclage is not indicated at this time due Hx of heavy vaginal bleeding on .  We reviewed high risk of membrane rupture and infection when Cx is this far dilated.        Ms Martinez reiterated her decision to continue expectant management at this time.  She agrees to present to L&D triage tomorrow for repeat sonogram.  Bleeding and PTL precautions discussed.    2.  Congenital heart block with pacemaker since 11 yrs old.  Follows with Cardiologist:  Dr. Ragsdale, [place], [place]. Next halle't 1/10/25. During 25 admission OB team was able to contact patient's OP cardiologist: Dr. Ragsdale  in [place], [place]. Per verbal report: "Pacemaker placed in  for congenital heart block. The last generator change was in . She follows up irregularly and completes remote checks of her device, however limited records available."   Pacemaker was interrogated by [facility] cardiology EP team, see report below: "Pts device MDT single chamber PPM was interrogated on 24: Device working properly Battery longevity: 4 years Mode: VDD 50/150 bpm Presenting rhythm: AsVp 90 bpm Patient has h/o intermittent 2nd and 3rd degree AV block Events: Several brief AHR episodes were recorded since 24 to 24 c/w PAT vs PAFIB.  Patient should follow up with her EP doctor as scheduled. Reviewed by attending Dr. Wooten"  3. Pregnancy. Panorama and Horizon screening recommended and pt states strong desire for this testing, however she has not yet presented to lab for blood draw.    Plan:  - Expectant mgmt per pt's decision.  - Patient to come to L&D 25 for TAUS  - To L&D for VB, LOF, fever, shills, LAP.   MD Bella, FACOG with JESS Green MD, PGY-3

## 2025-01-05 NOTE — END OF VISIT
[] : Resident [FreeTextEntry3] : Please see my detailed not above.  Time spent excludes resident teaching time.  kpb [Time Spent: ___ minutes] : I have spent [unfilled] minutes of time on the encounter which excludes teaching and separately reported services.
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
## 2025-02-20 NOTE — HISTORY OF PRESENT ILLNESS
[0/10] : no pain reported [Clean/Dry/Intact] : clean, dry and intact [Healed] : healed [Doing Well] : is doing well [No Sign of Infection] : is showing no signs of infection [Fever] : no fever [Chills] : no chills [Vaginal Bleeding] : no vaginal bleeding [Pelvic Pressure] : no pelvic pressure [Erythema] : not erythematous [Swelling] : not swollen [de-identified] : 35yo s/p c/s @29wks for PPROM and breech labor  [de-identified] : 33yo P1, s/p c/s 1/23/25, for incision check  [de-identified] : - healing well  - RTC 2 weeks for PP visit

## 2025-02-20 NOTE — HISTORY OF PRESENT ILLNESS
[0/10] : no pain reported [Clean/Dry/Intact] : clean, dry and intact [Healed] : healed [Doing Well] : is doing well [No Sign of Infection] : is showing no signs of infection [Fever] : no fever [Chills] : no chills [Vaginal Bleeding] : no vaginal bleeding [Pelvic Pressure] : no pelvic pressure [Erythema] : not erythematous [Swelling] : not swollen [de-identified] : 35yo s/p c/s @29wks for PPROM and breech labor  [de-identified] : 35yo P1, s/p c/s 1/23/25, for incision check  [de-identified] : - healing well  - RTC 2 weeks for PP visit
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
## 2025-04-01 NOTE — ED ADULT TRIAGE NOTE - NS ED TRIAGE EKG

## 2025-04-09 NOTE — HISTORY OF PRESENT ILLNESS
[Breastfeeding] : not currently nursing [FreeTextEntry8] : 33yo now P1 s/p primary c/s at 25w3d for PPROM and breech presentation.  Patient is without complaints at this time and reports feeling well overall.  Denies fever, chills, chest pain, SOB, palpitations, n/v. [de-identified] : 35yo now P1, s/p  section at 25w3d 2/2 PPROM and breech presentation, recovering well.  [de-identified] : pap UTD RTC for annual exam

## 2025-04-09 NOTE — HISTORY OF PRESENT ILLNESS
[Breastfeeding] : not currently nursing [FreeTextEntry8] : 35yo now P1 s/p primary c/s at 25w3d for PPROM and breech presentation.  Patient is without complaints at this time and reports feeling well overall.  Denies fever, chills, chest pain, SOB, palpitations, n/v. [de-identified] : 33yo now P1, s/p  section at 25w3d 2/2 PPROM and breech presentation, recovering well.  [de-identified] : pap UTD RTC for annual exam